# Patient Record
Sex: MALE | Race: BLACK OR AFRICAN AMERICAN | NOT HISPANIC OR LATINO
[De-identification: names, ages, dates, MRNs, and addresses within clinical notes are randomized per-mention and may not be internally consistent; named-entity substitution may affect disease eponyms.]

---

## 2018-06-20 ENCOUNTER — TRANSCRIPTION ENCOUNTER (OUTPATIENT)
Age: 33
End: 2018-06-20

## 2018-06-22 ENCOUNTER — TRANSCRIPTION ENCOUNTER (OUTPATIENT)
Age: 33
End: 2018-06-22

## 2019-02-16 ENCOUNTER — TRANSCRIPTION ENCOUNTER (OUTPATIENT)
Age: 34
End: 2019-02-16

## 2020-04-26 ENCOUNTER — MESSAGE (OUTPATIENT)
Age: 35
End: 2020-04-26

## 2022-05-13 ENCOUNTER — NON-APPOINTMENT (OUTPATIENT)
Age: 37
End: 2022-05-13

## 2022-05-16 PROBLEM — Z00.00 ENCOUNTER FOR PREVENTIVE HEALTH EXAMINATION: Status: ACTIVE | Noted: 2022-05-16

## 2022-05-19 ENCOUNTER — APPOINTMENT (OUTPATIENT)
Dept: INTERNAL MEDICINE | Facility: CLINIC | Age: 37
End: 2022-05-19

## 2022-05-21 ENCOUNTER — APPOINTMENT (OUTPATIENT)
Dept: ULTRASOUND IMAGING | Facility: CLINIC | Age: 37
End: 2022-05-21
Payer: COMMERCIAL

## 2022-05-21 PROCEDURE — 76700 US EXAM ABDOM COMPLETE: CPT

## 2022-05-24 ENCOUNTER — NON-APPOINTMENT (OUTPATIENT)
Age: 37
End: 2022-05-24

## 2022-05-24 ENCOUNTER — APPOINTMENT (OUTPATIENT)
Dept: INTERNAL MEDICINE | Facility: CLINIC | Age: 37
End: 2022-05-24
Payer: COMMERCIAL

## 2022-05-24 VITALS
DIASTOLIC BLOOD PRESSURE: 84 MMHG | TEMPERATURE: 97.1 F | BODY MASS INDEX: 42.66 KG/M2 | HEART RATE: 88 BPM | SYSTOLIC BLOOD PRESSURE: 148 MMHG | HEIGHT: 72 IN | OXYGEN SATURATION: 98 % | RESPIRATION RATE: 17 BRPM | WEIGHT: 315 LBS

## 2022-05-24 DIAGNOSIS — E78.00 PURE HYPERCHOLESTEROLEMIA, UNSPECIFIED: ICD-10-CM

## 2022-05-24 DIAGNOSIS — E11.9 TYPE 2 DIABETES MELLITUS W/OUT COMPLICATIONS: ICD-10-CM

## 2022-05-24 DIAGNOSIS — K21.9 GASTRO-ESOPHAGEAL REFLUX DISEASE W/OUT ESOPHAGITIS: ICD-10-CM

## 2022-05-24 DIAGNOSIS — Z87.442 PERSONAL HISTORY OF URINARY CALCULI: ICD-10-CM

## 2022-05-24 DIAGNOSIS — Z00.00 ENCOUNTER FOR GENERAL ADULT MEDICAL EXAMINATION W/OUT ABNORMAL FINDINGS: ICD-10-CM

## 2022-05-24 DIAGNOSIS — Z83.3 FAMILY HISTORY OF DIABETES MELLITUS: ICD-10-CM

## 2022-05-24 DIAGNOSIS — K76.0 FATTY (CHANGE OF) LIVER, NOT ELSEWHERE CLASSIFIED: ICD-10-CM

## 2022-05-24 DIAGNOSIS — I10 ESSENTIAL (PRIMARY) HYPERTENSION: ICD-10-CM

## 2022-05-24 DIAGNOSIS — R79.89 OTHER SPECIFIED ABNORMAL FINDINGS OF BLOOD CHEMISTRY: ICD-10-CM

## 2022-05-24 DIAGNOSIS — Z78.9 OTHER SPECIFIED HEALTH STATUS: ICD-10-CM

## 2022-05-24 DIAGNOSIS — E66.01 MORBID (SEVERE) OBESITY DUE TO EXCESS CALORIES: ICD-10-CM

## 2022-05-24 DIAGNOSIS — Z11.3 ENCOUNTER FOR SCREENING FOR INFECTIONS WITH A PREDOMINANTLY SEXUAL MODE OF TRANSMISSION: ICD-10-CM

## 2022-05-24 PROCEDURE — 36415 COLL VENOUS BLD VENIPUNCTURE: CPT

## 2022-05-24 PROCEDURE — 99385 PREV VISIT NEW AGE 18-39: CPT | Mod: 25

## 2022-05-24 PROCEDURE — 99204 OFFICE O/P NEW MOD 45 MIN: CPT | Mod: 25

## 2022-05-24 PROCEDURE — 93000 ELECTROCARDIOGRAM COMPLETE: CPT

## 2022-05-24 RX ORDER — OMEPRAZOLE 20 MG/1
20 CAPSULE, DELAYED RELEASE ORAL
Qty: 30 | Refills: 0 | Status: ACTIVE | COMMUNITY
Start: 2022-05-24 | End: 1900-01-01

## 2022-05-26 PROBLEM — E78.00 ELEVATED CHOLESTEROL: Status: ACTIVE | Noted: 2022-05-26

## 2022-05-26 PROBLEM — R79.89 ELEVATED LFTS: Status: ACTIVE | Noted: 2022-05-26

## 2022-05-26 LAB
25(OH)D3 SERPL-MCNC: 13.4 NG/ML
ALBUMIN SERPL ELPH-MCNC: 4.9 G/DL
ALP BLD-CCNC: 112 U/L
ALT SERPL-CCNC: 57 U/L
ANION GAP SERPL CALC-SCNC: 16 MMOL/L
APPEARANCE: CLEAR
AST SERPL-CCNC: 31 U/L
BASOPHILS # BLD AUTO: 0.06 K/UL
BASOPHILS NFR BLD AUTO: 0.6 %
BILIRUB SERPL-MCNC: 0.5 MG/DL
BILIRUBIN URINE: NEGATIVE
BLOOD URINE: NEGATIVE
BUN SERPL-MCNC: 10 MG/DL
C TRACH RRNA SPEC QL NAA+PROBE: NOT DETECTED
CALCIUM SERPL-MCNC: 10.4 MG/DL
CHLORIDE SERPL-SCNC: 102 MMOL/L
CHOLEST SERPL-MCNC: 225 MG/DL
CO2 SERPL-SCNC: 21 MMOL/L
COLOR: NORMAL
CREAT SERPL-MCNC: 0.87 MG/DL
EGFR: 113 ML/MIN/1.73M2
EOSINOPHIL # BLD AUTO: 0.03 K/UL
EOSINOPHIL NFR BLD AUTO: 0.3 %
ESTIMATED AVERAGE GLUCOSE: 203 MG/DL
GLUCOSE QUALITATIVE U: NEGATIVE
GLUCOSE SERPL-MCNC: 150 MG/DL
HBA1C MFR BLD HPLC: 8.7 %
HBV SURFACE AB SER QL: NONREACTIVE
HBV SURFACE AG SER QL: NONREACTIVE
HCT VFR BLD CALC: 50 %
HCV AB SER QL: NONREACTIVE
HCV S/CO RATIO: 0.12 S/CO
HDLC SERPL-MCNC: 44 MG/DL
HGB BLD-MCNC: 16.6 G/DL
HIV1+2 AB SPEC QL IA.RAPID: NONREACTIVE
IMM GRANULOCYTES NFR BLD AUTO: 0.5 %
KETONES URINE: NORMAL
LDLC SERPL CALC-MCNC: 160 MG/DL
LEUKOCYTE ESTERASE URINE: NEGATIVE
LYMPHOCYTES # BLD AUTO: 2.92 K/UL
LYMPHOCYTES NFR BLD AUTO: 27.3 %
MAN DIFF?: NORMAL
MCHC RBC-ENTMCNC: 29.7 PG
MCHC RBC-ENTMCNC: 33.2 GM/DL
MCV RBC AUTO: 89.6 FL
MONOCYTES # BLD AUTO: 0.44 K/UL
MONOCYTES NFR BLD AUTO: 4.1 %
N GONORRHOEA RRNA SPEC QL NAA+PROBE: NOT DETECTED
NEUTROPHILS # BLD AUTO: 7.21 K/UL
NEUTROPHILS NFR BLD AUTO: 67.2 %
NITRITE URINE: NEGATIVE
NONHDLC SERPL-MCNC: 181 MG/DL
PH URINE: 6.5
PLATELET # BLD AUTO: 292 K/UL
POTASSIUM SERPL-SCNC: 4.2 MMOL/L
PROT SERPL-MCNC: 8.5 G/DL
PROTEIN URINE: NEGATIVE
PSA FREE FLD-MCNC: 59 %
PSA FREE SERPL-MCNC: 0.45 NG/ML
PSA SERPL-MCNC: 0.77 NG/ML
RBC # BLD: 5.58 M/UL
RBC # FLD: 11.9 %
RPR SER-TITR: NORMAL
SODIUM SERPL-SCNC: 139 MMOL/L
SOURCE AMPLIFICATION: NORMAL
SPECIFIC GRAVITY URINE: 1.01
TRIGL SERPL-MCNC: 107 MG/DL
TSH SERPL-ACNC: 0.69 UIU/ML
UROBILINOGEN URINE: NORMAL
WBC # FLD AUTO: 10.71 K/UL

## 2022-06-02 ENCOUNTER — APPOINTMENT (OUTPATIENT)
Dept: GASTROENTEROLOGY | Facility: CLINIC | Age: 37
End: 2022-06-02

## 2022-06-02 PROBLEM — K76.0 FATTY LIVER: Status: ACTIVE | Noted: 2022-06-02

## 2022-06-02 PROBLEM — E11.9 DIABETES MELLITUS, NEW ONSET: Status: ACTIVE | Noted: 2022-05-26

## 2022-06-02 NOTE — COUNSELING
[Potential consequences of obesity discussed] : Potential consequences of obesity discussed [Benefits of weight loss discussed] : Benefits of weight loss discussed [Encouraged to increase physical activity] : Encouraged to increase physical activity [FreeTextEntry4] : 8

## 2022-06-02 NOTE — REVIEW OF SYSTEMS
[Abdominal Pain] : abdominal pain [Heartburn] : heartburn [Fever] : no fever [Chills] : no chills [Recent Change In Weight] : ~T no recent weight change [Vision Problems] : no vision problems [Earache] : no earache [Nasal Discharge] : no nasal discharge [Sore Throat] : no sore throat [Chest Pain] : no chest pain [Palpitations] : no palpitations [Shortness Of Breath] : no shortness of breath [Wheezing] : no wheezing [Nausea] : no nausea [Diarrhea] : no diarrhea [Vomiting] : no vomiting [Dysuria] : no dysuria [Hesitancy] : no hesitancy [Hematuria] : no hematuria [Frequency] : no frequency [Joint Pain] : no joint pain [Joint Swelling] : no joint swelling [Skin Rash] : no skin rash [Headache] : no headache [Dizziness] : no dizziness [Anxiety] : no anxiety [Depression] : no depression [Swollen Glands] : no swollen glands

## 2022-06-02 NOTE — HEALTH RISK ASSESSMENT
[Never] : Never [Yes] : Yes [Monthly or less (1 pt)] : Monthly or less (1 point) [3 or 4 (1 pt)] : 3 or 4  (1 point) [Never (0 pts)] : Never (0 points) [0] : 2) Feeling down, depressed, or hopeless: Not at all (0) [PHQ-2 Negative - No further assessment needed] : PHQ-2 Negative - No further assessment needed [Audit-CScore] : 2 [[id number]] : 0

## 2022-06-02 NOTE — HISTORY OF PRESENT ILLNESS
[FreeTextEntry1] : annual wellness  [de-identified] : 36 y/o male presents for annual wellness exam. He reports having intermittent left sided abdominal pain. He had a sono from urgent care recently, which showed fatty liver and was otherwise normal. He also reports having frequent acid reflux. He feels otherwise well and offers no other acute complaints or concerns. ROS as documented below.

## 2022-07-26 ENCOUNTER — NON-APPOINTMENT (OUTPATIENT)
Age: 37
End: 2022-07-26

## 2023-01-02 ENCOUNTER — NON-APPOINTMENT (OUTPATIENT)
Age: 38
End: 2023-01-02

## 2023-04-12 ENCOUNTER — NON-APPOINTMENT (OUTPATIENT)
Age: 38
End: 2023-04-12

## 2023-04-20 ENCOUNTER — APPOINTMENT (OUTPATIENT)
Dept: ORTHOPEDIC SURGERY | Facility: CLINIC | Age: 38
End: 2023-04-20
Payer: COMMERCIAL

## 2023-04-20 VITALS — WEIGHT: 315 LBS | HEIGHT: 72 IN | BODY MASS INDEX: 42.66 KG/M2

## 2023-04-20 DIAGNOSIS — M23.92 UNSPECIFIED INTERNAL DERANGEMENT OF LEFT KNEE: ICD-10-CM

## 2023-04-20 DIAGNOSIS — Z78.9 OTHER SPECIFIED HEALTH STATUS: ICD-10-CM

## 2023-04-20 DIAGNOSIS — M79.18 MYALGIA, OTHER SITE: ICD-10-CM

## 2023-04-20 PROCEDURE — 73564 X-RAY EXAM KNEE 4 OR MORE: CPT | Mod: LT

## 2023-04-20 PROCEDURE — J3490M: CUSTOM

## 2023-04-20 PROCEDURE — 20610 DRAIN/INJ JOINT/BURSA W/O US: CPT | Mod: LT

## 2023-04-20 PROCEDURE — 99204 OFFICE O/P NEW MOD 45 MIN: CPT | Mod: 25

## 2023-04-20 NOTE — ASSESSMENT
[FreeTextEntry1] : Left X-Ray Examination of the KNEE (4 views): there are no fractures, subluxations or dislocations.\par \par Due to the patients mechanical symptoms along with medial joint line pain, effusion, and pos darinel test on exam we will get an mri to eval for medial meniscus tear\par \par - The patient was advised of the diagnosis.  The natural history of the pathology was explained to the patient in layman's terms.  Several different treatment options were discussed and explained including the risks and benefits of both surgical and non-surgical treatments.\par - The patient was advised to apply ice (wrapped in a towel or protective covering) to the area daily (20 minutes at a time, 2-4X/day).\par - The patient was advised to modify their activities.\par - We also discussed the possible of a corticosteroid injection in order to help decrease inflammation and pain so that they can perform better therapy and they wished to proceed with this treatment course.\par - f/u after mri\par \par

## 2023-04-20 NOTE — HISTORY OF PRESENT ILLNESS
[de-identified] : 38 year old male  ([employer], [employer], [employer] )  left knee pain since 10/2022 while working out was getting up out of seating [place] style and felt a pop \par The pain is located  posterior, lateral \par The pain is associated with popping, clicking, catchingm locking\par Worse with activity and better at rest.\par Has tried ice, Tylenol \par

## 2023-04-20 NOTE — IMAGING
[de-identified] : \par LEFT KNEE\par Inspection:  mild effusion\par Palpation: medial joint line tenderness \par Knee Range of Motion:  0-130 \par Strength: 5/5 Quadriceps strength, 5/5 Hamstring strength\par Neurological: light touch is intact throughout\par Ligament Stability and Special Tests: \par McMurrays: Positive\par Lachman: neg\par Pivot Shift: neg\par Posterior Drawer: neg\par Valgus: neg\par Varus: neg\par Patella Apprehension: neg\par Patella Maltracking: neg\par

## 2023-04-23 ENCOUNTER — FORM ENCOUNTER (OUTPATIENT)
Age: 38
End: 2023-04-23

## 2023-04-24 ENCOUNTER — APPOINTMENT (OUTPATIENT)
Dept: MRI IMAGING | Facility: CLINIC | Age: 38
End: 2023-04-24
Payer: COMMERCIAL

## 2023-04-24 PROCEDURE — 73721 MRI JNT OF LWR EXTRE W/O DYE: CPT | Mod: LT

## 2023-05-04 ENCOUNTER — APPOINTMENT (OUTPATIENT)
Dept: ORTHOPEDIC SURGERY | Facility: CLINIC | Age: 38
End: 2023-05-04
Payer: COMMERCIAL

## 2023-05-04 PROCEDURE — 99214 OFFICE O/P EST MOD 30 MIN: CPT

## 2023-05-04 NOTE — HISTORY OF PRESENT ILLNESS
[de-identified] : 38 year old male  ([employer], [employer], [employer] )  left knee pain since 10/2022 while working out was getting up out of seating [place] style and felt a pop \par The pain is located  posterior, lateral \par The pain is associated with popping, clicking, catching, locking\par Worse with activity and better at rest.\par Has tried ice, Tylenol \par \par 5/4/23 - had csi with mild relief, cont locking and catching in knee, icing and mod activtiy, had mri

## 2023-05-04 NOTE — IMAGING
[de-identified] : \par LEFT KNEE\par Inspection:  mild effusion\par Palpation: medial and lateral joint line tenderness \par Knee Range of Motion:  5-120 \par Strength: 5/5 Quadriceps strength, 5/5 Hamstring strength\par Neurological: light touch is intact throughout\par Ligament Stability and Special Tests: \par McMurrays: Positive\par Lachman: neg\par Pivot Shift: neg\par Posterior Drawer: neg\par Valgus: neg\par Varus: neg\par Patella Apprehension: neg\par Patella Maltracking: neg\par

## 2023-05-04 NOTE — DISCUSSION/SUMMARY
[de-identified] : The patient was advised of the diagnosis.  The natural history of the pathology was explained in full to the patient in layman's terms.  Several different treatment options were discussed and explained to the patient including the risks and benefits of both surgical and non-surgical treatments.\par \par Specifically, it was discussed that the patient has more then one pathologic conditions at this point.  There is a meniscus tear and multiple loose bodies which are causing symptomology and there is also underlying osteoarthritis.  The patient was counseled that surgical intervention to address the loose bodies and torn meniscus will not change the symptomology attributable to the arthritis and therefore inferior surgical outcomes in this setting are not uncommon.  The patient was advised that the pain attributable to the meniscus tear and loose bodies should resolve arthroscopically.  However, the patient should expect to have continued aches and pains related to the arthritis, in the form of, but not limited to, pain, weather related changes, dull ache, crepitation, limitation of motion and strength, and the possible need for further surgeries. The patient understands that arthroscopy will only address whatever portion of their symptoms are coming from the meniscal pathology and loose bodies and that after surgery, the knee will not be 100%. The patient also is aware that further treatment after arthroscopy may be necessary in the form of oral medications, cortisone and/or viscosupplementation injections, and further surgeries including knee replacement. Specifically, I also reviewed with the patient that any anterior knee pain may paradoxically worsen for the first 6 weeks following arthroscopy due to quadriceps weakness. The risk of recurrent tears as well as progression of underlying arthritis, avascular necrosis, and / or chondrolysis were discussed as well. The patient demonstrated a full understanding and would like to proceed with left  knee surgical arthroscopy with partial medial and lateral meniscectomy and removal of loose bodies.\par \par The risks of surgery were outlined in full to the patient including but not limited to pain, infection (superficial or deep), bleeding, vascular injury, numbness, tingling, nerve damage (direct or indirect), scarring, non-healing, wound breakdown, failure to resolve symptoms, symptom recurrence, the need for further surgery, as well as medical complications such as blood clots, pulmonary embolism, heart attack, stroke, and other anesthesia complications including even death.  The patient understood and accepted these risks and that other complications not mentioned above could occur.    \par The intraoperative plan, post-operative plan, post-operative expectations and limitations were explained in full.  Expectations from non-surgical treatment were explained in full as well.  The patient demonstrated a complete understanding of the treatment and alternatives.\par \par

## 2023-05-04 NOTE — ASSESSMENT
[FreeTextEntry1] : mri left knee 4/24/23 - severe complex LMT, MM deg, synov, pop cyst, lat chondral loss, quest lbs\par \par \par - We discussed their diagnosis and treatment options at length including the risks and benefits of both surgical and non-surgical options.. \par - Given their active lifestyle along with pain and mechanical symptoms they are a surgical candidate.\par - The risks, benefits, and alternatives to left knee PLM, poss PMM, synov were discussed with the patient, all questions were answered.\par - will try 6 weeks of PT and then decide\par \par

## 2023-06-01 ENCOUNTER — APPOINTMENT (OUTPATIENT)
Dept: ORTHOPEDIC SURGERY | Facility: CLINIC | Age: 38
End: 2023-06-01
Payer: COMMERCIAL

## 2023-06-01 VITALS — HEIGHT: 72 IN | WEIGHT: 315 LBS | BODY MASS INDEX: 42.66 KG/M2

## 2023-06-01 DIAGNOSIS — M17.12 UNILATERAL PRIMARY OSTEOARTHRITIS, LEFT KNEE: ICD-10-CM

## 2023-06-01 DIAGNOSIS — M65.9 SYNOVITIS AND TENOSYNOVITIS, UNSPECIFIED: ICD-10-CM

## 2023-06-01 DIAGNOSIS — S83.272A COMPLEX TEAR OF LATERAL MENISCUS, CURRENT INJURY, LEFT KNEE, INITIAL ENCOUNTER: ICD-10-CM

## 2023-06-01 PROCEDURE — 99214 OFFICE O/P EST MOD 30 MIN: CPT

## 2023-06-01 NOTE — HISTORY OF PRESENT ILLNESS
[de-identified] : 38 year old male  ([employer], [employer], [employer] )  left knee pain since 10/2022 while working out was getting up out of seating " style" and felt a pop \par The pain is located  posterior, lateral \par The pain is associated with popping, clicking, catching, locking\par Worse with activity and better at rest.\par Has tried ice, Tylenol \par \par 5/4/23 - had csi with mild relief, cont locking and catching in knee, icing and mod activtiy, had mri\par 6/1/23- lt knee pain worsening and catching and swelling and lateral pain

## 2023-06-01 NOTE — IMAGING
[de-identified] : \par LEFT KNEE\par Inspection:  mild effusion\par Palpation: medial and lateral joint line tenderness \par Knee Range of Motion:  5-120 \par Strength: 5/5 Quadriceps strength, 5/5 Hamstring strength\par Neurological: light touch is intact throughout\par Ligament Stability and Special Tests: \par McMurrays: Positive\par Lachman: neg\par Pivot Shift: neg\par Posterior Drawer: neg\par Valgus: neg\par Varus: neg\par Patella Apprehension: neg\par Patella Maltracking: neg\par

## 2023-06-01 NOTE — ASSESSMENT
[FreeTextEntry1] : mri left knee 4/24/23 - severe complex LMT, MM deg, synov, pop cyst, lat chondral loss, quest lbs\par \par \par - We discussed their diagnosis and treatment options at length including the risks and benefits of both surgical and non-surgical options.. \par - Given their active lifestyle along with pain and mechanical symptoms they are a surgical candidate.\par - The risks, benefits, and alternatives to left knee PLM, poss PMM, synov were discussed with the patient, all questions were answered.\par \par \par

## 2023-06-01 NOTE — DISCUSSION/SUMMARY
[de-identified] : The patient was advised of the diagnosis.  The natural history of the pathology was explained in full to the patient in layman's terms.  Several different treatment options were discussed and explained to the patient including the risks and benefits of both surgical and non-surgical treatments.\par \par Specifically, it was discussed that the patient has more then one pathologic conditions at this point.  There is a meniscus tear and multiple loose bodies which are causing symptomology and there is also underlying osteoarthritis.  The patient was counseled that surgical intervention to address the loose bodies and torn meniscus will not change the symptomology attributable to the arthritis and therefore inferior surgical outcomes in this setting are not uncommon.  The patient was advised that the pain attributable to the meniscus tear and loose bodies should resolve arthroscopically.  However, the patient should expect to have continued aches and pains related to the arthritis, in the form of, but not limited to, pain, weather related changes, dull ache, crepitation, limitation of motion and strength, and the possible need for further surgeries. The patient understands that arthroscopy will only address whatever portion of their symptoms are coming from the meniscal pathology and loose bodies and that after surgery, the knee will not be 100%. The patient also is aware that further treatment after arthroscopy may be necessary in the form of oral medications, cortisone and/or viscosupplementation injections, and further surgeries including knee replacement. Specifically, I also reviewed with the patient that any anterior knee pain may paradoxically worsen for the first 6 weeks following arthroscopy due to quadriceps weakness. The risk of recurrent tears as well as progression of underlying arthritis, avascular necrosis, and / or chondrolysis were discussed as well. The patient demonstrated a full understanding and would like to proceed with left  knee surgical arthroscopy with partial medial and lateral meniscectomy and removal of loose bodies.\par \par The risks of surgery were outlined in full to the patient including but not limited to pain, infection (superficial or deep), bleeding, vascular injury, numbness, tingling, nerve damage (direct or indirect), scarring, non-healing, wound breakdown, failure to resolve symptoms, symptom recurrence, the need for further surgery, as well as medical complications such as blood clots, pulmonary embolism, heart attack, stroke, and other anesthesia complications including even death.  The patient understood and accepted these risks and that other complications not mentioned above could occur.    \par The intraoperative plan, post-operative plan, post-operative expectations and limitations were explained in full.  Expectations from non-surgical treatment were explained in full as well.  The patient demonstrated a complete understanding of the treatment and alternatives.\par \par

## 2024-06-07 ENCOUNTER — TRANSCRIPTION ENCOUNTER (OUTPATIENT)
Age: 39
End: 2024-06-07

## 2024-08-01 ENCOUNTER — NON-APPOINTMENT (OUTPATIENT)
Age: 39
End: 2024-08-01